# Patient Record
Sex: FEMALE | URBAN - METROPOLITAN AREA
[De-identification: names, ages, dates, MRNs, and addresses within clinical notes are randomized per-mention and may not be internally consistent; named-entity substitution may affect disease eponyms.]

---

## 2020-12-23 ENCOUNTER — APPOINTMENT (RX ONLY)
Dept: URBAN - METROPOLITAN AREA CLINIC 55 | Facility: CLINIC | Age: 40
Setting detail: DERMATOLOGY
End: 2020-12-23

## 2020-12-23 DIAGNOSIS — Z41.9 ENCOUNTER FOR PROCEDURE FOR PURPOSES OTHER THAN REMEDYING HEALTH STATE, UNSPECIFIED: ICD-10-CM

## 2020-12-23 PROCEDURE — ? BOTOX

## 2020-12-23 NOTE — PROCEDURE: BOTOX
Lcl Root Units: 0
Additional Area 1 Units: 1
Consent: Written consent obtained. Risks include but not limited to lid/brow ptosis, bruising, swelling, diplopia, temporary effect, incomplete chemical denervation.
Show Additional Area 3: Yes
Show Ucl Units: No
Lot #: [id number]
Additional Area 3 Location: Chin
Glabellar Complex Units: 8
Expiration Date (Month Year): 9/30/2023
Detail Level: Detailed
Dilution (U/0.1 Cc): 4
Additional Area 1 Location: Left Lateral Brow
Post-Care Instructions: Patient instructed to not lie down for 4 hours and limit physical activity for 24 hours.
Additional Area 2 Location: Upper lip

## 2021-03-24 ENCOUNTER — APPOINTMENT (RX ONLY)
Dept: URBAN - METROPOLITAN AREA CLINIC 55 | Facility: CLINIC | Age: 41
Setting detail: DERMATOLOGY
End: 2021-03-24

## 2021-03-24 DIAGNOSIS — Z41.9 ENCOUNTER FOR PROCEDURE FOR PURPOSES OTHER THAN REMEDYING HEALTH STATE, UNSPECIFIED: ICD-10-CM

## 2021-03-24 PROCEDURE — ? BOTOX

## 2021-03-24 NOTE — PROCEDURE: BOTOX
Detail Level: Detailed
R Brow Units: 0
Dilution (U/0.1 Cc): 4
Show Additional Area 4: Yes
Forehead Units: 8
Additional Area 3 Location: Chin
Show Lcl Units: No
Additional Area 1 Units: 1
Consent: Written consent obtained. Risks include but not limited to lid/brow ptosis, bruising, swelling, diplopia, temporary effect, incomplete chemical denervation.
Expiration Date (Month Year): 12/2023
Additional Area 2 Location: Upper lip
Additional Area 1 Location: Left Lateral Brow
Post-Care Instructions: Patient instructed to not lie down for 4 hours and limit physical activity for 24 hours.
Lot #: [id number]

## 2021-06-16 ENCOUNTER — APPOINTMENT (RX ONLY)
Dept: URBAN - METROPOLITAN AREA CLINIC 55 | Facility: CLINIC | Age: 41
Setting detail: DERMATOLOGY
End: 2021-06-16

## 2021-06-16 DIAGNOSIS — Z41.9 ENCOUNTER FOR PROCEDURE FOR PURPOSES OTHER THAN REMEDYING HEALTH STATE, UNSPECIFIED: ICD-10-CM

## 2021-06-16 PROCEDURE — ? BOTOX

## 2021-06-16 NOTE — PROCEDURE: BOTOX
Lcl Root Units: 0
Consent: Written consent obtained. Risks include but not limited to lid/brow ptosis, bruising, swelling, diplopia, temporary effect, incomplete chemical denervation.
Show Lcl Units: No
Show Periorbital Units: Yes
Forehead Units: 8
Additional Area 1 Units: 1
Additional Area 2 Location: upper lip
Additional Area 1 Location: Left Lateral Brow
Lot #: [id number]
Additional Area 3 Location: Chin
Dilution (U/0.1 Cc): 4
Detail Level: Detailed
Expiration Date (Month Year): 9/2023
Post-Care Instructions: Patient instructed to not lie down for 4 hours and limit physical activity for 24 hours.

## 2021-09-15 ENCOUNTER — APPOINTMENT (RX ONLY)
Dept: URBAN - METROPOLITAN AREA CLINIC 55 | Facility: CLINIC | Age: 41
Setting detail: DERMATOLOGY
End: 2021-09-15

## 2021-09-15 DIAGNOSIS — Z41.9 ENCOUNTER FOR PROCEDURE FOR PURPOSES OTHER THAN REMEDYING HEALTH STATE, UNSPECIFIED: ICD-10-CM

## 2021-09-15 PROCEDURE — ? BOTOX

## 2021-09-15 NOTE — PROCEDURE: BOTOX
Glabellar Complex Units: 8
L Brow Units: 0
Additional Area 2 Location: Upper lip
Dilution (U/0.1 Cc): 4
Forehead Units: 9
Show Additional Area 2: Yes
Lot #: [id number]
Show Right And Left Periorbital Units: No
Additional Area 3 Location: Chin
Expiration Date (Month Year): 11/2023
Detail Level: Detailed
Additional Area 1 Location: Left Lateral Brow
Consent: Written consent obtained. Risks include but not limited to lid/brow ptosis, bruising, swelling, diplopia, temporary effect, incomplete chemical denervation.
Post-Care Instructions: Patient instructed to not lie down for 4 hours and limit physical activity for 24 hours.

## 2021-12-15 ENCOUNTER — APPOINTMENT (RX ONLY)
Dept: URBAN - METROPOLITAN AREA CLINIC 55 | Facility: CLINIC | Age: 41
Setting detail: DERMATOLOGY
End: 2021-12-15

## 2021-12-15 DIAGNOSIS — Z41.9 ENCOUNTER FOR PROCEDURE FOR PURPOSES OTHER THAN REMEDYING HEALTH STATE, UNSPECIFIED: ICD-10-CM

## 2021-12-15 PROCEDURE — ? BOTOX

## 2021-12-15 NOTE — PROCEDURE: BOTOX
Show Forehead Units: Yes
L Brow Units: 0
Show Mentalis Units: No
Additional Area 3 Location: Chin
Post-Care Instructions: Patient instructed to not lie down for 4 hours and limit physical activity for 24 hours.
Additional Area 4 Location: upper cutaneous lip
Consent: Written consent obtained. Risks include but not limited to lid/brow ptosis, bruising, swelling, diplopia, temporary effect, incomplete chemical denervation.
Forehead Units: 9
Lot #: [id number]
Expiration Date (Month Year): 3/2024
Additional Area 2 Location: Upper lip
Additional Area 1 Location: Left Lateral Brow
Dilution (U/0.1 Cc): 4
Glabellar Complex Units: 8
Detail Level: Detailed

## 2022-03-16 ENCOUNTER — APPOINTMENT (RX ONLY)
Dept: URBAN - METROPOLITAN AREA CLINIC 55 | Facility: CLINIC | Age: 42
Setting detail: DERMATOLOGY
End: 2022-03-16

## 2022-03-16 DIAGNOSIS — Z41.9 ENCOUNTER FOR PROCEDURE FOR PURPOSES OTHER THAN REMEDYING HEALTH STATE, UNSPECIFIED: ICD-10-CM

## 2022-03-16 PROCEDURE — ? BOTOX

## 2022-03-16 NOTE — PROCEDURE: BOTOX
Left Periorbital Skin Units: 0
Consent: Written consent obtained. Risks include but not limited to lid/brow ptosis, bruising, swelling, diplopia, temporary effect, incomplete chemical denervation.
Show Additional Area 6: Yes
Additional Area 3 Location: Chin
Show Right And Left Brow Units: No
Lot #: [id number]
Additional Area 2 Location: Upper lip
Glabellar Complex Units: 8
Dilution (U/0.1 Cc): 4
Detail Level: Detailed
Expiration Date (Month Year): 4/2024
Forehead Units: 9
Post-Care Instructions: Patient instructed to not lie down for 4 hours and limit physical activity for 24 hours.
Additional Area 1 Location: Lateral Brows
Additional Area 4 Location: upper cutaneous lip

## 2022-06-15 ENCOUNTER — APPOINTMENT (RX ONLY)
Dept: URBAN - METROPOLITAN AREA CLINIC 55 | Facility: CLINIC | Age: 42
Setting detail: DERMATOLOGY
End: 2022-06-15

## 2022-06-15 DIAGNOSIS — Z41.9 ENCOUNTER FOR PROCEDURE FOR PURPOSES OTHER THAN REMEDYING HEALTH STATE, UNSPECIFIED: ICD-10-CM

## 2022-06-15 PROCEDURE — ? BOTOX

## 2022-06-15 NOTE — PROCEDURE: BOTOX
R Brow Units: 0
Show Right And Left Pupillary Line Units: No
Detail Level: Detailed
Show Additional Area 5: Yes
Additional Area 2 Location: Upper lip
Post-Care Instructions: Patient instructed to not lie down for 4 hours and limit physical activity for 24 hours.
Expiration Date (Month Year): 4/2024
Additional Area 1 Location: Right Lateral Brow
Lot #: [id number]
Additional Area 3 Location: Chin
Glabellar Complex Units: 8
Dilution (U/0.1 Cc): 4
Forehead Units: 9
Additional Area 5 Location: lower eye lids
Consent: Written consent obtained. Risks include but not limited to lid/brow ptosis, bruising, swelling, diplopia, temporary effect, incomplete chemical denervation.

## 2022-09-14 ENCOUNTER — APPOINTMENT (RX ONLY)
Dept: URBAN - METROPOLITAN AREA CLINIC 55 | Facility: CLINIC | Age: 42
Setting detail: DERMATOLOGY
End: 2022-09-14

## 2022-09-14 DIAGNOSIS — Z41.9 ENCOUNTER FOR PROCEDURE FOR PURPOSES OTHER THAN REMEDYING HEALTH STATE, UNSPECIFIED: ICD-10-CM

## 2022-09-14 PROCEDURE — ? BOTOX

## 2022-09-14 NOTE — PROCEDURE: BOTOX
Show Additional Area 6: Yes
Dilution (U/0.1 Cc): 4
Glabellar Complex Units: 8
R Brow Units: 0
Forehead Units: 9
Additional Area 1 Location: upper lip
Show Mentalis Units: No
Additional Area 2 Location: chin
Show Inventory Tab: Show
Consent: Verbal consent obtained. Risks include but not limited to lid/brow ptosis, bruising, swelling, diplopia, temporary effect, incomplete chemical denervation.
Detail Level: Detailed
Post-Care Instructions: Patient instructed to not lie down for 4 hours and limit physical activity for 24 hours.

## 2022-12-14 ENCOUNTER — APPOINTMENT (RX ONLY)
Dept: URBAN - METROPOLITAN AREA CLINIC 55 | Facility: CLINIC | Age: 42
Setting detail: DERMATOLOGY
End: 2022-12-14

## 2022-12-14 DIAGNOSIS — Z41.9 ENCOUNTER FOR PROCEDURE FOR PURPOSES OTHER THAN REMEDYING HEALTH STATE, UNSPECIFIED: ICD-10-CM

## 2022-12-14 PROCEDURE — ? BOTOX

## 2022-12-14 NOTE — PROCEDURE: BOTOX
Dilution (U/0.1 Cc): 4
Show Additional Area 5: Yes
Nasal Root Units: 0
Post-Care Instructions: Patient instructed to not lie down for 4 hours and limit physical activity for 24 hours.
Show Right And Left Periorbital Units: No
Glabellar Complex Units: 8
Additional Area 1 Location: upper and lower lip
Forehead Units: 9
Detail Level: Detailed
Show Inventory Tab: Show
Additional Area 2 Location: chin
Consent: Verbal consent obtained. Risks include but not limited to lid/brow ptosis, bruising, swelling, diplopia, temporary effect, incomplete chemical denervation.

## 2023-03-20 ENCOUNTER — APPOINTMENT (RX ONLY)
Dept: URBAN - METROPOLITAN AREA CLINIC 55 | Facility: CLINIC | Age: 43
Setting detail: DERMATOLOGY
End: 2023-03-20

## 2023-03-20 DIAGNOSIS — Z41.9 ENCOUNTER FOR PROCEDURE FOR PURPOSES OTHER THAN REMEDYING HEALTH STATE, UNSPECIFIED: ICD-10-CM

## 2023-03-20 PROCEDURE — ? BOTOX

## 2023-03-20 NOTE — PROCEDURE: BOTOX
Depressor Anguli Oris Units: 0
Show Additional Area 3: Yes
Additional Area 2 Location: chin
Detail Level: Detailed
Dilution (U/0.1 Cc): 4
Incrementing Botox Units: By 0.5 Units
Show Lcl Units: No
Additional Area 1 Location: upper lip
Consent: Verbal consent obtained. Risks include but not limited to lid/brow ptosis, bruising, swelling, diplopia, temporary effect, incomplete chemical denervation.
Show Inventory Tab: Show
Forehead Units: 9
Glabellar Complex Units: 8
Post-Care Instructions: Patient instructed to not lie down for 4 hours and limit physical activity for 24 hours.

## 2023-05-08 ENCOUNTER — APPOINTMENT (RX ONLY)
Dept: URBAN - METROPOLITAN AREA CLINIC 55 | Facility: CLINIC | Age: 43
Setting detail: DERMATOLOGY
End: 2023-05-08

## 2023-05-08 DIAGNOSIS — Z41.9 ENCOUNTER FOR PROCEDURE FOR PURPOSES OTHER THAN REMEDYING HEALTH STATE, UNSPECIFIED: ICD-10-CM

## 2023-05-08 PROCEDURE — ? BOTOX

## 2023-05-08 NOTE — PROCEDURE: BOTOX
Show Depressor Anguli Units: Yes
Glabellar Complex Units: 0
Show Right And Left Brow Units: No
Additional Area 3 Location: lower lip
Consent: Verbal consent obtained. Risks include but not limited to lid/brow ptosis, bruising, swelling, diplopia, temporary effect, incomplete chemical denervation.
Post-Care Instructions: Patient instructed to not lie down for 4 hours and limit physical activity for 24 hours.
Incrementing Botox Units: By 0.5 Units
Dilution (U/0.1 Cc): 4
Additional Area 2 Location: chin
Detail Level: Detailed
Forehead Units: 9
Show Inventory Tab: Show
Additional Area 1 Location: upper lip

## 2023-07-11 ENCOUNTER — APPOINTMENT (RX ONLY)
Dept: URBAN - METROPOLITAN AREA CLINIC 55 | Facility: CLINIC | Age: 43
Setting detail: DERMATOLOGY
End: 2023-07-11

## 2023-07-11 DIAGNOSIS — Z41.9 ENCOUNTER FOR PROCEDURE FOR PURPOSES OTHER THAN REMEDYING HEALTH STATE, UNSPECIFIED: ICD-10-CM

## 2023-07-11 PROCEDURE — ? BOTOX

## 2023-07-11 NOTE — PROCEDURE: BOTOX
Mentalis Units: 0
Show Nasal Units: Yes
Show Ucl Units: No
Additional Area 1 Location: upper lip
Incrementing Botox Units: By 0.5 Units
Consent: Verbal consent obtained. Risks include but not limited to lid/brow ptosis, bruising, swelling, diplopia, temporary effect, incomplete chemical denervation.
Dilution (U/0.1 Cc): 4
Post-Care Instructions: Patient instructed to not lie down for 4 hours and limit physical activity for 24 hours.
Detail Level: Detailed
Glabellar Complex Units: 8
Additional Area 3 Location: lower lip
Forehead Units: 9
Show Inventory Tab: Show
Additional Area 2 Location: chin

## 2023-09-21 ENCOUNTER — APPOINTMENT (RX ONLY)
Dept: URBAN - METROPOLITAN AREA CLINIC 55 | Facility: CLINIC | Age: 43
Setting detail: DERMATOLOGY
End: 2023-09-21

## 2023-09-21 DIAGNOSIS — Z41.9 ENCOUNTER FOR PROCEDURE FOR PURPOSES OTHER THAN REMEDYING HEALTH STATE, UNSPECIFIED: ICD-10-CM

## 2023-09-21 PROCEDURE — ? BOTOX

## 2023-09-21 NOTE — PROCEDURE: BOTOX
Nasal Root Units: 0
Show Levator Superior Units: Yes
Detail Level: Detailed
Show Right And Left Periorbital Units: No
Additional Area 2 Location: chin
Incrementing Botox Units: By 0.5 Units
Additional Area 1 Location: upper lip
Dilution (U/0.1 Cc): 4
Consent: Verbal consent obtained. Risks include but not limited to lid/brow ptosis, bruising, swelling, diplopia, temporary effect, incomplete chemical denervation.
Show Inventory Tab: Show
Forehead Units: 9
Glabellar Complex Units: 8
Post-Care Instructions: Patient instructed to not lie down for 4 hours and limit physical activity for 24 hours.
Additional Area 3 Location: lower lip

## 2023-12-07 ENCOUNTER — APPOINTMENT (RX ONLY)
Dept: URBAN - METROPOLITAN AREA CLINIC 55 | Facility: CLINIC | Age: 43
Setting detail: DERMATOLOGY
End: 2023-12-07

## 2023-12-07 DIAGNOSIS — Z41.9 ENCOUNTER FOR PROCEDURE FOR PURPOSES OTHER THAN REMEDYING HEALTH STATE, UNSPECIFIED: ICD-10-CM

## 2023-12-07 PROCEDURE — ? BOTOX

## 2023-12-07 NOTE — PROCEDURE: BOTOX
Periorbital Skin Units: 0
Dilution (U/0.1 Cc): 4
Post-Care Instructions: Patient instructed to not lie down for 4 hours and limit physical activity for 24 hours.
Show Additional Area 5: Yes
Show Lcl Units: No
Incrementing Botox Units: By 0.5 Units
Detail Level: Detailed
Additional Area 3 Location: lower lip
Forehead Units: 9
Glabellar Complex Units: 8
Show Inventory Tab: Show
Additional Area 2 Location: chin
Consent: Verbal consent obtained. Risks include but not limited to lid/brow ptosis, bruising, swelling, diplopia, temporary effect, incomplete chemical denervation.
Additional Area 1 Location: upper lip

## 2024-02-21 ENCOUNTER — APPOINTMENT (RX ONLY)
Dept: URBAN - METROPOLITAN AREA CLINIC 55 | Facility: CLINIC | Age: 44
Setting detail: DERMATOLOGY
End: 2024-02-21

## 2024-02-21 DIAGNOSIS — Z41.9 ENCOUNTER FOR PROCEDURE FOR PURPOSES OTHER THAN REMEDYING HEALTH STATE, UNSPECIFIED: ICD-10-CM

## 2024-02-21 PROCEDURE — ? BOTOX

## 2024-02-21 NOTE — PROCEDURE: BOTOX
L Brow Units: 0
Additional Area 3 Location: lower lip
Show Lateral Platysmal Band Units: Yes
Show Ucl Units: No
Consent: Verbal consent obtained. Risks include but not limited to lid/brow ptosis, bruising, swelling, diplopia, temporary effect, incomplete chemical denervation.
Additional Area 2 Location: chin
Post-Care Instructions: Patient instructed to not lie down for 4 hours and limit physical activity for 24 hours.
Detail Level: Detailed
Incrementing Botox Units: By 0.5 Units
Forehead Units: 9
Additional Area 1 Location: upper lip
Show Inventory Tab: Show
Dilution (U/0.1 Cc): 4
Glabellar Complex Units: 8

## 2024-05-21 ENCOUNTER — APPOINTMENT (RX ONLY)
Dept: URBAN - METROPOLITAN AREA CLINIC 55 | Facility: CLINIC | Age: 44
Setting detail: DERMATOLOGY
End: 2024-05-21

## 2024-05-21 DIAGNOSIS — Z41.9 ENCOUNTER FOR PROCEDURE FOR PURPOSES OTHER THAN REMEDYING HEALTH STATE, UNSPECIFIED: ICD-10-CM

## 2024-05-21 PROCEDURE — ? BOTOX

## 2024-05-21 NOTE — PROCEDURE: BOTOX
Show Right And Left Periorbital Units: No
Show Additional Area 2: Yes
Show Inventory Tab: Show
Additional Area 3 Units: 0
Consent: Verbal consent obtained. Risks include but not limited to lid/brow ptosis, bruising, swelling, diplopia, temporary effect, incomplete chemical denervation.
Additional Area 2 Location: chin
Dilution (U/0.1 Cc): 4
Post-Care Instructions: Patient instructed to not lie down for 4 hours and limit physical activity for 24 hours.
Forehead Units: 10
Additional Area 1 Location: upper lip
Glabellar Complex Units: 8
Incrementing Botox Units: By 0.5 Units
Additional Area 3 Location: lower lip
Detail Level: Detailed

## 2024-08-21 ENCOUNTER — APPOINTMENT (RX ONLY)
Dept: URBAN - METROPOLITAN AREA CLINIC 55 | Facility: CLINIC | Age: 44
Setting detail: DERMATOLOGY
End: 2024-08-21

## 2024-08-21 DIAGNOSIS — Z41.9 ENCOUNTER FOR PROCEDURE FOR PURPOSES OTHER THAN REMEDYING HEALTH STATE, UNSPECIFIED: ICD-10-CM

## 2024-08-21 PROCEDURE — ? BOTOX

## 2024-08-21 NOTE — PROCEDURE: BOTOX
Show Forehead Units: Yes
Additional Area 6 Units: 0
Additional Area 3 Location: lower lip
Incrementing Botox Units: By 0.5 Units
Forehead Units: 10
Show Right And Left Pupillary Line Units: No
Detail Level: Detailed
Show Inventory Tab: Show
Glabellar Complex Units: 8
Additional Area 2 Location: chin
Consent: Verbal consent obtained. Risks include but not limited to lid/brow ptosis, bruising, swelling, diplopia, temporary effect, incomplete chemical denervation.
Dilution (U/0.1 Cc): 4
Additional Area 1 Location: upper lip
Post-Care Instructions: Patient instructed to not lie down for 4 hours and limit physical activity for 24 hours.

## 2024-11-13 ENCOUNTER — APPOINTMENT (RX ONLY)
Dept: URBAN - METROPOLITAN AREA CLINIC 55 | Facility: CLINIC | Age: 44
Setting detail: DERMATOLOGY
End: 2024-11-13

## 2024-11-13 DIAGNOSIS — Z41.9 ENCOUNTER FOR PROCEDURE FOR PURPOSES OTHER THAN REMEDYING HEALTH STATE, UNSPECIFIED: ICD-10-CM

## 2024-11-13 PROCEDURE — ? BOTOX

## 2024-11-13 NOTE — PROCEDURE: BOTOX
Lateral Platysmal Bands Units: 0
Show Nasal Units: Yes
Show Right And Left Brow Units: No
Consent: Verbal consent obtained. Risks include but not limited to lid/brow ptosis, bruising, swelling, diplopia, temporary effect, incomplete chemical denervation.
Additional Area 1 Location: upper lip
Incrementing Botox Units: By 0.5 Units
Dilution (U/0.1 Cc): 4
Post-Care Instructions: Patient instructed to not lie down for 4 hours and limit physical activity for 24 hours.
Forehead Units: 10
Detail Level: Detailed
Show Inventory Tab: Show
Glabellar Complex Units: 8
Additional Area 3 Location: lower lip
Additional Area 2 Location: chin

## 2025-01-23 ENCOUNTER — APPOINTMENT (OUTPATIENT)
Dept: URBAN - METROPOLITAN AREA CLINIC 55 | Facility: CLINIC | Age: 45
Setting detail: DERMATOLOGY
End: 2025-01-23

## 2025-01-23 DIAGNOSIS — Z41.9 ENCOUNTER FOR PROCEDURE FOR PURPOSES OTHER THAN REMEDYING HEALTH STATE, UNSPECIFIED: ICD-10-CM

## 2025-01-23 PROCEDURE — ? BOTOX

## 2025-01-23 NOTE — PROCEDURE: BOTOX
Left Periorbital Skin Units: 0
Show Ucl Units: No
Show Additional Area 6: Yes
Consent obtained and risk reviewed.
Forehead Units: 10
Post-Care Instructions: Discussed not to lie down flat  or vigorously rub the treatment area for the next 4 hours .
Dilution (U/0.1 Cc): 4
Show Inventory Tab: Show
Glabellar Complex Units: 8
Comments: Make up was removed from treatment area and then prepped with 70% isopropyl alcohol prior to injections.
Additional Area 1 Location: upper lip
Detail Level: Detailed
Incrementing Botox Units: By 0.5 Units

## 2025-04-14 ENCOUNTER — APPOINTMENT (OUTPATIENT)
Dept: URBAN - METROPOLITAN AREA CLINIC 55 | Facility: CLINIC | Age: 45
Setting detail: DERMATOLOGY
End: 2025-04-14

## 2025-04-14 DIAGNOSIS — Z41.9 ENCOUNTER FOR PROCEDURE FOR PURPOSES OTHER THAN REMEDYING HEALTH STATE, UNSPECIFIED: ICD-10-CM

## 2025-04-14 PROCEDURE — ? BOTOX

## 2025-04-14 NOTE — PROCEDURE: BOTOX
Depressor Anguli Oris Units: 0
Dilution (U/0.1 Cc): 4
Show Right And Left Periorbital Units: No
Show Topical Anesthesia: Yes
Comments: Make up was removed from treatment area and then prepped with 70% isopropyl alcohol prior to injections.
Additional Area 1 Location: upper lip
Incrementing Botox Units: By 0.5 Units
Detail Level: Detailed
Forehead Units: 10
Show Inventory Tab: Show
Glabellar Complex Units: 8
Consent obtained and risk reviewed.
Post-Care Instructions: Discussed not to lie down flat  or vigorously rub the treatment area for the next 4 hours .

## 2025-06-26 ENCOUNTER — APPOINTMENT (OUTPATIENT)
Dept: URBAN - METROPOLITAN AREA CLINIC 55 | Facility: CLINIC | Age: 45
Setting detail: DERMATOLOGY
End: 2025-06-26

## 2025-06-26 DIAGNOSIS — Z41.9 ENCOUNTER FOR PROCEDURE FOR PURPOSES OTHER THAN REMEDYING HEALTH STATE, UNSPECIFIED: ICD-10-CM

## 2025-06-26 PROCEDURE — ? BOTOX

## 2025-06-26 NOTE — PROCEDURE: BOTOX
Additional Area 6 Units: 0
Show Right And Left Pupillary Line Units: No
Show Lateral Platysmal Band Units: Yes
Consent obtained and risk reviewed.
Additional Area 1 Location: upper lip
Detail Level: Detailed
Dilution (U/0.1 Cc): 4
Incrementing Botox Units: By 0.5 Units
Post-Care Instructions: Discussed not to lie down flat  or vigorously rub the treatment area for the next 4 hours .
Forehead Units: 10
Show Inventory Tab: Show
Glabellar Complex Units: 8
Comments: Make up was removed from treatment area and then prepped with 70% isopropyl alcohol prior to injections.